# Patient Record
Sex: MALE | Race: WHITE | NOT HISPANIC OR LATINO | Employment: OTHER | URBAN - METROPOLITAN AREA
[De-identification: names, ages, dates, MRNs, and addresses within clinical notes are randomized per-mention and may not be internally consistent; named-entity substitution may affect disease eponyms.]

---

## 2018-03-31 ENCOUNTER — HOSPITAL ENCOUNTER (OUTPATIENT)
Facility: MEDICAL CENTER | Age: 83
End: 2018-04-01
Attending: EMERGENCY MEDICINE | Admitting: HOSPITALIST
Payer: MEDICARE

## 2018-03-31 ENCOUNTER — APPOINTMENT (OUTPATIENT)
Dept: RADIOLOGY | Facility: MEDICAL CENTER | Age: 83
End: 2018-03-31
Attending: EMERGENCY MEDICINE
Payer: MEDICARE

## 2018-03-31 ENCOUNTER — RESOLUTE PROFESSIONAL BILLING HOSPITAL PROF FEE (OUTPATIENT)
Dept: HOSPITALIST | Facility: MEDICAL CENTER | Age: 83
End: 2018-03-31
Payer: MEDICARE

## 2018-03-31 PROBLEM — D64.9 NORMOCYTIC ANEMIA: Status: ACTIVE | Noted: 2018-03-31

## 2018-03-31 PROBLEM — D72.829 LEUKOCYTOSIS: Status: ACTIVE | Noted: 2018-03-31

## 2018-03-31 PROBLEM — E11.649 TYPE 2 DIABETES MELLITUS WITH HYPOGLYCEMIA (HCC): Status: ACTIVE | Noted: 2018-03-31

## 2018-03-31 PROBLEM — E87.1 HYPONATREMIA: Status: ACTIVE | Noted: 2018-03-31

## 2018-03-31 LAB
ALBUMIN SERPL BCP-MCNC: 4.1 G/DL (ref 3.2–4.9)
ALBUMIN/GLOB SERPL: 1.6 G/DL
ALP SERPL-CCNC: 61 U/L (ref 30–99)
ALT SERPL-CCNC: 26 U/L (ref 2–50)
ANION GAP SERPL CALC-SCNC: 9 MMOL/L (ref 0–11.9)
APPEARANCE UR: CLEAR
AST SERPL-CCNC: 26 U/L (ref 12–45)
BASOPHILS # BLD AUTO: 1.7 % (ref 0–1.8)
BASOPHILS # BLD: 0.22 K/UL (ref 0–0.12)
BILIRUB SERPL-MCNC: 1.1 MG/DL (ref 0.1–1.5)
BILIRUB UR QL STRIP.AUTO: NEGATIVE
BUN SERPL-MCNC: 22 MG/DL (ref 8–22)
CALCIUM SERPL-MCNC: 9.1 MG/DL (ref 8.5–10.5)
CHLORIDE SERPL-SCNC: 99 MMOL/L (ref 96–112)
CO2 SERPL-SCNC: 25 MMOL/L (ref 20–33)
COLOR UR: YELLOW
CREAT SERPL-MCNC: 1.23 MG/DL (ref 0.5–1.4)
CULTURE IF INDICATED INDCX: NO UA CULTURE
EOSINOPHIL # BLD AUTO: 0 K/UL (ref 0–0.51)
EOSINOPHIL NFR BLD: 0 % (ref 0–6.9)
ERYTHROCYTE [DISTWIDTH] IN BLOOD BY AUTOMATED COUNT: 41.9 FL (ref 35.9–50)
EST. AVERAGE GLUCOSE BLD GHB EST-MCNC: 214 MG/DL
GLOBULIN SER CALC-MCNC: 2.5 G/DL (ref 1.9–3.5)
GLUCOSE BLD-MCNC: 153 MG/DL (ref 65–99)
GLUCOSE BLD-MCNC: 175 MG/DL (ref 65–99)
GLUCOSE BLD-MCNC: 190 MG/DL (ref 65–99)
GLUCOSE BLD-MCNC: 197 MG/DL (ref 65–99)
GLUCOSE SERPL-MCNC: 122 MG/DL (ref 65–99)
GLUCOSE UR STRIP.AUTO-MCNC: 250 MG/DL
HBA1C MFR BLD: 9.1 % (ref 0–5.6)
HCT VFR BLD AUTO: 35.5 % (ref 42–52)
HGB BLD-MCNC: 11.7 G/DL (ref 14–18)
KETONES UR STRIP.AUTO-MCNC: NEGATIVE MG/DL
LEUKOCYTE ESTERASE UR QL STRIP.AUTO: NEGATIVE
LYMPHOCYTES # BLD AUTO: 4.04 K/UL (ref 1–4.8)
LYMPHOCYTES NFR BLD: 31.6 % (ref 22–41)
MANUAL DIFF BLD: NORMAL
MCH RBC QN AUTO: 31.5 PG (ref 27–33)
MCHC RBC AUTO-ENTMCNC: 33 G/DL (ref 33.7–35.3)
MCV RBC AUTO: 95.7 FL (ref 81.4–97.8)
MICRO URNS: ABNORMAL
MONOCYTES # BLD AUTO: 0.56 K/UL (ref 0–0.85)
MONOCYTES NFR BLD AUTO: 4.4 % (ref 0–13.4)
MORPHOLOGY BLD-IMP: NORMAL
MYELOCYTES NFR BLD MANUAL: 0.9 %
NEUTROPHILS # BLD AUTO: 7.86 K/UL (ref 1.82–7.42)
NEUTROPHILS NFR BLD: 60.5 % (ref 44–72)
NEUTS BAND NFR BLD MANUAL: 0.9 % (ref 0–10)
NITRITE UR QL STRIP.AUTO: NEGATIVE
NRBC # BLD AUTO: 0 K/UL
NRBC BLD-RTO: 0 /100 WBC
PH UR STRIP.AUTO: 5.5 [PH]
PLATELET # BLD AUTO: 157 K/UL (ref 164–446)
PLATELET BLD QL SMEAR: NORMAL
PMV BLD AUTO: 11.2 FL (ref 9–12.9)
POTASSIUM SERPL-SCNC: 4.1 MMOL/L (ref 3.6–5.5)
PROT SERPL-MCNC: 6.6 G/DL (ref 6–8.2)
PROT UR QL STRIP: NEGATIVE MG/DL
RBC # BLD AUTO: 3.71 M/UL (ref 4.7–6.1)
RBC BLD AUTO: PRESENT
RBC UR QL AUTO: NEGATIVE
SMUDGE CELLS BLD QL SMEAR: NORMAL
SODIUM SERPL-SCNC: 133 MMOL/L (ref 135–145)
SP GR UR STRIP.AUTO: 1.01
TROPONIN I SERPL-MCNC: 0.02 NG/ML (ref 0–0.04)
UROBILINOGEN UR STRIP.AUTO-MCNC: 0.2 MG/DL
VARIANT LYMPHS BLD QL SMEAR: NORMAL
WBC # BLD AUTO: 12.8 K/UL (ref 4.8–10.8)

## 2018-03-31 PROCEDURE — 81003 URINALYSIS AUTO W/O SCOPE: CPT

## 2018-03-31 PROCEDURE — 71045 X-RAY EXAM CHEST 1 VIEW: CPT

## 2018-03-31 PROCEDURE — 85007 BL SMEAR W/DIFF WBC COUNT: CPT

## 2018-03-31 PROCEDURE — 80053 COMPREHEN METABOLIC PANEL: CPT

## 2018-03-31 PROCEDURE — 83036 HEMOGLOBIN GLYCOSYLATED A1C: CPT

## 2018-03-31 PROCEDURE — 96372 THER/PROPH/DIAG INJ SC/IM: CPT

## 2018-03-31 PROCEDURE — 99285 EMERGENCY DEPT VISIT HI MDM: CPT

## 2018-03-31 PROCEDURE — 84484 ASSAY OF TROPONIN QUANT: CPT

## 2018-03-31 PROCEDURE — 306588 SLEEVE,VASO CALF MED: Performed by: HOSPITALIST

## 2018-03-31 PROCEDURE — 82962 GLUCOSE BLOOD TEST: CPT

## 2018-03-31 PROCEDURE — 85027 COMPLETE CBC AUTOMATED: CPT

## 2018-03-31 PROCEDURE — 36415 COLL VENOUS BLD VENIPUNCTURE: CPT

## 2018-03-31 PROCEDURE — A9270 NON-COVERED ITEM OR SERVICE: HCPCS | Performed by: HOSPITALIST

## 2018-03-31 PROCEDURE — G0378 HOSPITAL OBSERVATION PER HR: HCPCS

## 2018-03-31 PROCEDURE — 99220 PR INITIAL OBSERVATION CARE,LEVL III: CPT | Performed by: HOSPITALIST

## 2018-03-31 PROCEDURE — 700102 HCHG RX REV CODE 250 W/ 637 OVERRIDE(OP): Performed by: HOSPITALIST

## 2018-03-31 RX ORDER — DEXTROSE MONOHYDRATE 25 G/50ML
25 INJECTION, SOLUTION INTRAVENOUS
Status: DISCONTINUED | OUTPATIENT
Start: 2018-03-31 | End: 2018-04-01 | Stop reason: HOSPADM

## 2018-03-31 RX ORDER — FUROSEMIDE 20 MG/1
10 TABLET ORAL DAILY
Status: DISCONTINUED | OUTPATIENT
Start: 2018-03-31 | End: 2018-04-01 | Stop reason: HOSPADM

## 2018-03-31 RX ORDER — BISACODYL 10 MG
10 SUPPOSITORY, RECTAL RECTAL
Status: DISCONTINUED | OUTPATIENT
Start: 2018-03-31 | End: 2018-04-01 | Stop reason: HOSPADM

## 2018-03-31 RX ORDER — HEPARIN SODIUM 5000 [USP'U]/ML
5000 INJECTION, SOLUTION INTRAVENOUS; SUBCUTANEOUS EVERY 8 HOURS
Status: DISCONTINUED | OUTPATIENT
Start: 2018-03-31 | End: 2018-03-31

## 2018-03-31 RX ORDER — ONDANSETRON 4 MG/1
4 TABLET, ORALLY DISINTEGRATING ORAL EVERY 4 HOURS PRN
Status: DISCONTINUED | OUTPATIENT
Start: 2018-03-31 | End: 2018-04-01 | Stop reason: HOSPADM

## 2018-03-31 RX ORDER — FUROSEMIDE 20 MG/1
10 TABLET ORAL DAILY
COMMUNITY

## 2018-03-31 RX ORDER — INSULIN GLARGINE 100 [IU]/ML
10 INJECTION, SOLUTION SUBCUTANEOUS
Status: DISCONTINUED | OUTPATIENT
Start: 2018-03-31 | End: 2018-04-01 | Stop reason: HOSPADM

## 2018-03-31 RX ORDER — LOSARTAN POTASSIUM 50 MG/1
50 TABLET ORAL DAILY
COMMUNITY

## 2018-03-31 RX ORDER — AMOXICILLIN 250 MG
2 CAPSULE ORAL 2 TIMES DAILY
Status: DISCONTINUED | OUTPATIENT
Start: 2018-03-31 | End: 2018-04-01 | Stop reason: HOSPADM

## 2018-03-31 RX ORDER — POLYETHYLENE GLYCOL 3350 17 G/17G
1 POWDER, FOR SOLUTION ORAL
Status: DISCONTINUED | OUTPATIENT
Start: 2018-03-31 | End: 2018-04-01 | Stop reason: HOSPADM

## 2018-03-31 RX ORDER — ONDANSETRON 2 MG/ML
4 INJECTION INTRAMUSCULAR; INTRAVENOUS EVERY 4 HOURS PRN
Status: DISCONTINUED | OUTPATIENT
Start: 2018-03-31 | End: 2018-04-01 | Stop reason: HOSPADM

## 2018-03-31 RX ORDER — ACETAMINOPHEN 325 MG/1
650 TABLET ORAL EVERY 6 HOURS PRN
Status: DISCONTINUED | OUTPATIENT
Start: 2018-03-31 | End: 2018-04-01 | Stop reason: HOSPADM

## 2018-03-31 RX ORDER — INSULIN GLARGINE 100 [IU]/ML
4-24 INJECTION, SOLUTION SUBCUTANEOUS 2 TIMES DAILY
Status: ON HOLD | COMMUNITY
End: 2018-04-01

## 2018-03-31 RX ADMIN — APIXABAN 5 MG: 5 TABLET, FILM COATED ORAL at 11:20

## 2018-03-31 RX ADMIN — INSULIN GLARGINE 10 UNITS: 100 INJECTION, SOLUTION SUBCUTANEOUS at 13:01

## 2018-03-31 RX ADMIN — FUROSEMIDE 10 MG: 20 TABLET ORAL at 11:20

## 2018-03-31 RX ADMIN — METFORMIN HYDROCHLORIDE 500 MG: 500 TABLET, FILM COATED ORAL at 18:05

## 2018-03-31 RX ADMIN — APIXABAN 5 MG: 5 TABLET, FILM COATED ORAL at 20:05

## 2018-03-31 RX ADMIN — VITAMIN D, TAB 1000IU (100/BT) 1000 UNITS: 25 TAB at 11:20

## 2018-03-31 ASSESSMENT — PATIENT HEALTH QUESTIONNAIRE - PHQ9
1. LITTLE INTEREST OR PLEASURE IN DOING THINGS: NOT AT ALL
2. FEELING DOWN, DEPRESSED, IRRITABLE, OR HOPELESS: NOT AT ALL
SUM OF ALL RESPONSES TO PHQ9 QUESTIONS 1 AND 2: 0

## 2018-03-31 ASSESSMENT — PAIN SCALES - GENERAL
PAINLEVEL_OUTOF10: 0
PAINLEVEL_OUTOF10: 0

## 2018-03-31 ASSESSMENT — ENCOUNTER SYMPTOMS
WHEEZING: 0
DIARRHEA: 0
VOMITING: 0
FEVER: 0
SHORTNESS OF BREATH: 0
NAUSEA: 0
HEADACHES: 0
COUGH: 0
CHILLS: 1
CONSTIPATION: 0

## 2018-03-31 ASSESSMENT — LIFESTYLE VARIABLES
ALCOHOL_USE: NO
EVER_SMOKED: NEVER

## 2018-03-31 NOTE — H&P
Hospital Medicine History and Physical      Date of Service  3/31/2018    Chief Complaint  Chief Complaint   Patient presents with   • ALOC     Upon waking.  Now resolved. Pt reports difficulty speaking initially.  Resolved on arrival.   • Low Blood Sugar     51 at home.  To 122 after PO glucose.       History of Presenting Illness  Jong is a 89 y.o. male w/h/o diabetes who presents with hypoglycemia. Patient did have a history of hypoglycemic episode several years ago where she went to the ER and then this resolved. She could not remember the details. This time, she has been having occasional hypoglycemia over the past week. She [facility] with [facility] as well as [facility] pharmacist and they called her yesterday to decrease her Lantus from 13 twice a day down to 10. Last night was her first dose of the ten units. She did not have any decreased appetite recently or nausea. Patient ate yogurt as well as cereal and pizza. This morning, she woke up and had confusion and difficulty speaking. Thus, EMS was called and noticed that the patient's blood sugar was 51. She was given oral glucose and her sugar improved to 122 and she was brought over to the hospital.   Primary Care Physician  [facility] well as in [place]    Code Status  Full code per patient with family present    Review of Systems  Review of Systems   Constitutional: Positive for chills and malaise/fatigue. Negative for fever.   Respiratory: Negative for cough, shortness of breath and wheezing.    Cardiovascular: Positive for leg swelling. Negative for chest pain.   Gastrointestinal: Negative for constipation, diarrhea, nausea and vomiting.   Genitourinary: Negative for dysuria.   Neurological: Negative for headaches.     Please see HPI, all other systems were reviewed and are negative (AMA/CMS criteria)   Past Medical History  Past Medical History:   Diagnosis Date   • Diabetes (CMS-[facility])    • GERD (gastroesophageal reflux  disease)    • TIA (transient ischemic attack)      Past Surgical history:   Patient denies any previous surgeries     Medications  No current facility-administered medications on file prior to encounter.      No current outpatient prescriptions on file prior to encounter.     Family History  History reviewed. No pertinent family history.  Mother  of old age  Father had strokes  Social History  Social History   Substance Use Topics   • Smoking status: Never Smoker   • Smokeless tobacco: Never Used   • Alcohol use Yes      Comment: rare       Allergies  Allergies   Allergen Reactions   • Pcn [Penicillins]         Physical Exam  Laboratory   Hemodynamics  Temp (24hrs), Av.3 °C (97.3 °F), Min:36 °C (96.8 °F), Max:36.6 °C (97.8 °F)   Temperature: 36 °C (96.8 °F)  Pulse  Av.8  Min: 52  Max: 63 Heart Rate (Monitored): 60  Blood Pressure : 158/66, NIBP: 156/72      Respiratory      Respiration: 14, Pulse Oximetry: 95 %             Physical Exam   Constitutional: He is oriented to person, place, and time. He appears well-developed and well-nourished.   HENT:   Head: Normocephalic.   Right Ear: External ear normal.   Left Ear: External ear normal.   Nose: Nose normal.   Eyes: Conjunctivae and EOM are normal. Pupils are equal, round, and reactive to light. Right eye exhibits no discharge. Left eye exhibits no discharge. No scleral icterus.   Neck: Neck supple. No tracheal deviation present.   Cardiovascular: Normal rate.  Exam reveals no gallop and no friction rub.    Pulmonary/Chest: No respiratory distress. He has no wheezes. He has no rales.   Abdominal: Soft. He exhibits no distension. There is no tenderness. There is no rebound and no guarding.   Musculoskeletal: He exhibits edema.   Neurological: He is alert and oriented to person, place, and time.   Skin: Skin is warm and dry. No rash noted. No erythema.   Psychiatric: He has a normal mood and affect.       Recent Labs      18   0830   WBC  12.8*    RBC  3.71*   HEMOGLOBIN  11.7*   HEMATOCRIT  35.5*   MCV  95.7   MCH  31.5   MCHC  33.0*   RDW  41.9   PLATELETCT  157*   MPV  11.2     Recent Labs      03/31/18   0830   SODIUM  133*   POTASSIUM  4.1   CHLORIDE  99   CO2  25   GLUCOSE  122*   BUN  22   CREATININE  1.23   CALCIUM  9.1     Recent Labs      03/31/18   0830   ALTSGPT  26   ASTSGOT  26   ALKPHOSPHAT  61   TBILIRUBIN  1.1   GLUCOSE  122*                 Lab Results   Component Value Date    TROPONINI 0.02 03/31/2018       Imaging  DX-CHEST-PORTABLE (1 VIEW)   Final Result         Patchy bibasilar opacities could relate to atelectasis.      A 8 mm pulmonary nodule in the left midlung. Further evaluation with CT on nonemergent basis.        EKG  per my independant read:  QTc: 436, HR: 52, junctional bradycardia, no ST/T changes     Assessment/Plan     I anticipate this patient is appropriate for observation status at this time.    Hyponatremia- (present on admission)   Assessment & Plan    - Mild, continue to monitor        Normocytic anemia- (present on admission)   Assessment & Plan    - Mild, continue to monitor        Leukocytosis- (present on admission)   Assessment & Plan    Leukocytosis  - Likely reactive, no signs of overt infection  - Defer antibiotics for now  - Continue to monitor          Type 2 diabetes mellitus with hypoglycemia (CMS-HCC)- (present on admission)   Assessment & Plan    ER pharmacist felt that patient may not completely understand insulin pens and educated patient  Patient likely needs lower dose of Lantus at home  Decreased patient to 10 units b.i.d. and will monitor to see how the patient does here  Hypoglycemic protocol  No sliding scale insulin as patient does not use that at home and we want to replicate her home situation here so we can catch any hypoglycemia that occurs            Prophylaxis:  sc heparin

## 2018-03-31 NOTE — ED PROVIDER NOTES
"ED Provider Note    Scribed for Emigdio Pascual M.D. by Nigel Zavaleta. 3/31/2018  8:38 AM    Primary Care Provider: None  Means of arrival: ambulance  History limited by:     CHIEF COMPLAINT  Chief Complaint   Patient presents with   • ALOC     Upon waking.  Now resolved. Pt reports difficulty speaking initially.  Resolved on arrival.   • Low Blood Sugar     51 at home.  To 122 after PO glucose.       ELIDAI Sunshine is a 89 y.o. male who presents to the ED complaining of altered mental status onset this morning at 6:30 AM. Per patient, he recalls waking up this morning and he reports he did not eat his breakfast. Patient's wife reports the patient was having difficulty speaking which began at 6:30 AM when he woke up this morning. She explains she and the patient were laying in bed when she noticed the patient was having difficulty speaking and seemed confused. When she turned to look at him, she also noticed patient \"looked funny\". The patient endorses associated weakness, neck soreness. Patient's wife notes the patient's blood sugar was 51 upon EMS arrival, for which he was given PO glucose. Afterwards, patient's blood sugar improved to 122. Wife states patient's symptoms were improved after he was given glucose. The patient's symptoms are resolved on exam. The patient does not note any exacerbating factors. Patient's wife confirms a history of Diabetes, for which he takes Glucophage and Lantis. The pattient and his wife do not recall when his medications were last refilled. Wife states the patient seemed normal last night; however, she notes she gave the patient less insulin last night because his sugars have been running low for the past week as recommended by the patient's pharmacist. She adds there has not been any changes to the patient's medications otherwise. Patient denies fevers, shortness of breath, chest pain, sore throat, numbness, weight loss, nausea, vomiting, headache, vision changes, abdominal " "pain, dysuria, polyuria, polydipsia, or rash.     REVIEW OF SYSTEMS    CONSTITUTIONAL: Positive for generalized weakness. Denies fever,  weight gain/loss.  EYES: Denies vision changes  ENT:  Denies sore throat  CARDIOVASCULAR:  Denies chest pain  RESPIRATORY:  Denies shortness of breath  GI:  Denies nausea or vomiting  : Denies dysuria.  SKIN:  No rash  NEUROLOGIC: Positive difficulty speaking, confusion, generalized weakness, neck soreness. Denies headache or numbness.  ENDOCRINE: Positive hypoglycemia. Denies polyuria or polydipsia.   C.       PAST MEDICAL HISTORY  Past Medical History:   Diagnosis Date   • Diabetes (CMS-HCC)    • GERD (gastroesophageal reflux disease)    • TIA (transient ischemic attack)        FAMILY HISTORY  History reviewed. No pertinent family history.    SOCIAL HISTORY   reports that he has never smoked. He has never used smokeless tobacco. He reports that he drinks alcohol. He reports that he does not use drugs.    SURGICAL HISTORY  History reviewed. No pertinent surgical history.    CURRENT MEDICATIONS  Home Medications     Reviewed by Shira Engel R.N. (Registered Nurse) on 03/31/18 at 0826  Med List Status: Partial   Medication Last Dose Status   Apixaban (ELIQUIS PO)  Active   Furosemide (LASIX PO)  Active   INSULIN ASPART SC  Active   metFORMIN (GLUCOPHAGE) 500 MG Tab  Active   Omeprazole (PRILOSEC PO)  Active                ALLERGIES  Allergies   Allergen Reactions   • Pcn [Penicillins]        PHYSICAL EXAM  VITAL SIGNS: /68   Pulse (!) 52   Temp 36.6 °C (97.8 °F)   Resp 16   Ht 1.727 m (5' 8\")   Wt 86.2 kg (190 lb)   SpO2 89%   BMI 28.89 kg/m²      Constitutional: Patient is awake and alert. No acute respiratory distress. Well developed, Well nourished, Non-toxic appearance.  HENT: Normocephalic, Atraumatic, Bilateral external ears normal, Oropharynx pink moist with no exudates, Nose patent. No thymic enlargement.   Eyes: PERRLA, EOMI, Sclera and conjunctiva " clear, No discharge.   Neck:  Supple no nuchal rigidity, no thyromegaly or mass. Non-tender  Lymphatic: No supraclavicular lymph nodes.   Cardiovascular: Distant heart sounds. Heart is regular rate and rhythm.  Thorax & Lungs: Chest is symmetrical, with good breath sounds. No wheezing or crackles. No respiratory distress, No chest tenderness.   Abdomen: Soft, No tenderness no hepatosplenomegaly there is no guarding or rebound, No masses, No pulsatile masses.   Skin: Warm, Dry, no petechia, purpura, or rash.   Extremities: No edema. Non tender.   Musculoskeletal: Good range of motion to upper or lower extremities. No lateralizing weakness Neurologic: Alert & oriented to person, place, and time. Strength is 5 over 5 , bicep triceps, quadriceps, plantar, flexion, and extension. Sensory is intact to light touch to face, arms, and legs. DTRs are symmetrical to biceps, brachioradialis, patella, and Achilles. Finger to nose and heel to shin were normal. No facial asymmetry.  Negative pronator drift.  No focal deficits noted.   Psychiatric: Normal affect      LABS  Results for orders placed or performed during the hospital encounter of 03/31/18   CBC w/ Differential   Result Value Ref Range    WBC 12.8 (H) 4.8 - 10.8 K/uL    RBC 3.71 (L) 4.70 - 6.10 M/uL    Hemoglobin 11.7 (L) 14.0 - 18.0 g/dL    Hematocrit 35.5 (L) 42.0 - 52.0 %    MCV 95.7 81.4 - 97.8 fL    MCH 31.5 27.0 - 33.0 pg    MCHC 33.0 (L) 33.7 - 35.3 g/dL    RDW 41.9 35.9 - 50.0 fL    Platelet Count 157 (L) 164 - 446 K/uL    MPV 11.2 9.0 - 12.9 fL    Nucleated RBC 0.00 /100 WBC    NRBC (Absolute) 0.00 K/uL    Neutrophils-Polys 60.50 44.00 - 72.00 %    Lymphocytes 31.60 22.00 - 41.00 %    Monocytes 4.40 0.00 - 13.40 %    Eosinophils 0.00 0.00 - 6.90 %    Basophils 1.70 0.00 - 1.80 %    Neutrophils (Absolute) 7.86 (H) 1.82 - 7.42 K/uL    Lymphs (Absolute) 4.04 1.00 - 4.80 K/uL    Monos (Absolute) 0.56 0.00 - 0.85 K/uL    Eos (Absolute) 0.00 0.00 - 0.51 K/uL     Baso (Absolute) 0.22 (H) 0.00 - 0.12 K/uL   Complete Metabolic Panel (CMP)   Result Value Ref Range    Sodium 133 (L) 135 - 145 mmol/L    Potassium 4.1 3.6 - 5.5 mmol/L    Chloride 99 96 - 112 mmol/L    Co2 25 20 - 33 mmol/L    Anion Gap 9.0 0.0 - 11.9    Glucose 122 (H) 65 - 99 mg/dL    Bun 22 8 - 22 mg/dL    Creatinine 1.23 0.50 - 1.40 mg/dL    Calcium 9.1 8.5 - 10.5 mg/dL    AST(SGOT) 26 12 - 45 U/L    ALT(SGPT) 26 2 - 50 U/L    Alkaline Phosphatase 61 30 - 99 U/L    Total Bilirubin 1.1 0.1 - 1.5 mg/dL    Albumin 4.1 3.2 - 4.9 g/dL    Total Protein 6.6 6.0 - 8.2 g/dL    Globulin 2.5 1.9 - 3.5 g/dL    A-G Ratio 1.6 g/dL   Troponin   Result Value Ref Range    Troponin I 0.02 0.00 - 0.04 ng/mL   ESTIMATED GFR   Result Value Ref Range    GFR If African American >60 >60 mL/min/1.73 m 2    GFR If Non African American 55 (A) >60 mL/min/1.73 m 2   DIFFERENTIAL MANUAL   Result Value Ref Range    Bands-Stabs 0.90 0.00 - 10.00 %    Myelocytes 0.90 %    Manual Diff Status PERFORMED    PERIPHERAL SMEAR REVIEW   Result Value Ref Range    Peripheral Smear Review see below    PLATELET ESTIMATE   Result Value Ref Range    Plt Estimation Decreased    MORPHOLOGY   Result Value Ref Range    RBC Morphology Present     Smudge Cells Few     Reactive Lymphocytes Few    ACCU-CHEK GLUCOSE   Result Value Ref Range    Glucose - Accu-Ck 175 (H) 65 - 99 mg/dL      All labs reviewed by me.    RADIOLOGY/PROCEDURES  DX-CHEST-PORTABLE (1 VIEW)   Final Result         Patchy bibasilar opacities could relate to atelectasis.      A 8 mm pulmonary nodule in the left midlung. Further evaluation with CT on nonemergent basis.        The radiologist's interpretations of all radiological studies have been reviewed by me.     COURSE & MEDICAL DECISION MAKING  Pertinent Labs & Imaging studies reviewed. (See chart for details)    Differential diagnoses include but are not limited to hypoglycemia, unclear if this medication induced or decreased calory input  versus infections.     8:38 AM - Patient seen and examined at bedside. Ordered chest x-ray, CBC with differential, CMP, Troponin, UA, Hemoglobin A1c.  I discussed the plan of care with the patient and his family. They understood and verbalized agreement.      9:08 AM - I discussed the patient's case and the above findings with Dr. Emanuel (Hospitalist) who agrees to admit the patient.      9:10 AM - Reevaluated the patient at bedside. He is resting comfortably in the gurney and is asymptomatic at this time. Discussed admission plans with the patient and his family. They understood and verbalized agreement.      10:14 AM - Updated patient and his family on the patient's lab and imaging results. Instructed patient to follow-up with his primary care provider regarding is 8 mm pulmonary nodule. The patient and his family do not have further questions at this time.     Decision Making  Patient who presented with confusion. I believe this was from hypoglycemia. For the past week he has been having glucose is down in the 60s to 70s in the mornings. In the mid 100s in the evenings. Last night they decreased his insulin dose however this morning after he woke up prior to breakfast he had slurred speech confusion and weakness. Paramedics arrived and found him with a glucose of 51. He was then given glucose and immediately became normal again. Upon arrival to the emergency room his examination in my opinion is quite good. I see no signs of stroke. I believe that this was a hypoglycemic event. I discussed the case with pharmacy and they've gone over his medications with him and his wife. I also discussed the case with the [facility] Hospitalist and he will admit the patient the hospital for further care and evaluation. The concern of course is quite for the past week as he began running quite low sugars and then today even with a decrease insulin dose last night has more hypoglycemia to the point where asymptomatic. No signs of  infection were found at this time.        DISPOSITION:  Patient will be admitted to Dr. Emanuel in guarded condition.     FINAL IMPRESSION  1. Hypoglycemia  2. Diabetes mellitus    PLAN  1. Admission [facility]ist  2. Continued workup and evaluation for his hypoglycemia       Nigel OROPEZA (Scribe), am scribing for, and in the presence of, Emigdio Pascual M.D..    Electronically signed by: Nigel Zavaleta (Scribe), 3/31/2018    IEmigdio M.D. personally performed the services described in this documentation, as scribed by Nigel Zavaleta in my presence, and it is both accurate and complete.    The note accurately reflects work and decisions made by me.  Emigdio Pascual  3/31/2018  11:13 AM

## 2018-03-31 NOTE — DISCHARGE PLANNING
Care Transition Team Assessment    Children live close by and are available within one hour or so.  Discharge plan is to return home to [place].  No needs at this time.    Information Source  Orientation : Oriented x 4  Information Given By: Spouse  Informant's Name:  (Stephanie Sunshine)  Who is responsible for making decisions for patient? : Patient    Readmission Evaluation  Is this a readmission?: No    Elopement Risk  Legal Hold: No    Interdisciplinary Discharge Planning  Does Admitting Nurse Feel This Could be a Complex Discharge?: No  [facility]  [facility]   [facility])  Lives with - Patient's Self Care Capacity: Spouse  Support Systems: Children, Spouse / Significant Other  Housing / Facility: [address]  Do You Take your Prescribed Medications Regularly: Yes  Able to Return to Previous ADL's: Yes  Mobility Issues: No  Prior Services: None  Patient Expects to be Discharged to::  (Home)  Assistance Needed: No  Durable Medical Equipment: Not Applicable    Discharge Preparedness  What is your plan after discharge?: Home with help  What are your discharge supports?: Child, Spouse  Prior Functional Level: Ambulatory  Difficulity with ADLs: None  Difficulity with IADLs: None    Functional Assesment  Prior Functional Level: Ambulatory    Finances  Financial Barriers to Discharge: No  Prescription Coverage: Yes              Advance Directive  Advance Directive?: Living Will, DPOA for Health Care  Durable Power of  Name and Contact :  (Stephanie Sunshine    [phone])    Domestic Abuse  Have you ever been the victim of abuse or violence?: No  Physical Abuse or Sexual Abuse: No    Psychological Assessment  History of Substance Abuse: None  History of Psychiatric Problems: No    Discharge Risks or Barriers  Discharge risks or barriers?: No    Anticipated Discharge Information  Anticipated discharge disposition: Home  Discharge Address:  ([address] [place] [address]  [id number])  Discharge Contact Phone Number:  ([phone])

## 2018-04-01 VITALS
TEMPERATURE: 97.9 F | WEIGHT: 179.9 LBS | SYSTOLIC BLOOD PRESSURE: 130 MMHG | HEART RATE: 63 BPM | BODY MASS INDEX: 27.26 KG/M2 | RESPIRATION RATE: 16 BRPM | DIASTOLIC BLOOD PRESSURE: 60 MMHG | HEIGHT: 68 IN | OXYGEN SATURATION: 96 %

## 2018-04-01 PROBLEM — E16.2 HYPOGLYCEMIA: Status: ACTIVE | Noted: 2018-04-01

## 2018-04-01 PROBLEM — E16.2 HYPOGLYCEMIA: Status: RESOLVED | Noted: 2018-04-01 | Resolved: 2018-04-01

## 2018-04-01 LAB
ALBUMIN SERPL BCP-MCNC: 3.7 G/DL (ref 3.2–4.9)
BASOPHILS # BLD AUTO: 0 % (ref 0–1.8)
BASOPHILS # BLD: 0 K/UL (ref 0–0.12)
BUN SERPL-MCNC: 20 MG/DL (ref 8–22)
CALCIUM SERPL-MCNC: 8.7 MG/DL (ref 8.5–10.5)
CHLORIDE SERPL-SCNC: 100 MMOL/L (ref 96–112)
CO2 SERPL-SCNC: 24 MMOL/L (ref 20–33)
CREAT SERPL-MCNC: 1.11 MG/DL (ref 0.5–1.4)
EOSINOPHIL # BLD AUTO: 0 K/UL (ref 0–0.51)
EOSINOPHIL NFR BLD: 0 % (ref 0–6.9)
ERYTHROCYTE [DISTWIDTH] IN BLOOD BY AUTOMATED COUNT: 40.6 FL (ref 35.9–50)
GLUCOSE BLD-MCNC: 100 MG/DL (ref 65–99)
GLUCOSE BLD-MCNC: 161 MG/DL (ref 65–99)
GLUCOSE SERPL-MCNC: 118 MG/DL (ref 65–99)
HCT VFR BLD AUTO: 32.7 % (ref 42–52)
HGB BLD-MCNC: 10.9 G/DL (ref 14–18)
LYMPHOCYTES # BLD AUTO: 7.56 K/UL (ref 1–4.8)
LYMPHOCYTES NFR BLD: 46.1 % (ref 22–41)
MANUAL DIFF BLD: NORMAL
MCH RBC QN AUTO: 31.2 PG (ref 27–33)
MCHC RBC AUTO-ENTMCNC: 33.3 G/DL (ref 33.7–35.3)
MCV RBC AUTO: 93.7 FL (ref 81.4–97.8)
MONOCYTES # BLD AUTO: 0.43 K/UL (ref 0–0.85)
MONOCYTES NFR BLD AUTO: 2.6 % (ref 0–13.4)
MORPHOLOGY BLD-IMP: NORMAL
NEUTROPHILS # BLD AUTO: 8.41 K/UL (ref 1.82–7.42)
NEUTROPHILS NFR BLD: 51.3 % (ref 44–72)
NRBC # BLD AUTO: 0 K/UL
NRBC BLD-RTO: 0 /100 WBC
PHOSPHATE SERPL-MCNC: 3.1 MG/DL (ref 2.5–4.5)
PLATELET # BLD AUTO: 147 K/UL (ref 164–446)
PLATELET BLD QL SMEAR: NORMAL
PMV BLD AUTO: 11.3 FL (ref 9–12.9)
POTASSIUM SERPL-SCNC: 4 MMOL/L (ref 3.6–5.5)
RBC # BLD AUTO: 3.49 M/UL (ref 4.7–6.1)
RBC BLD AUTO: NORMAL
SODIUM SERPL-SCNC: 133 MMOL/L (ref 135–145)
WBC # BLD AUTO: 16.4 K/UL (ref 4.8–10.8)

## 2018-04-01 PROCEDURE — 85007 BL SMEAR W/DIFF WBC COUNT: CPT

## 2018-04-01 PROCEDURE — 36415 COLL VENOUS BLD VENIPUNCTURE: CPT

## 2018-04-01 PROCEDURE — 80069 RENAL FUNCTION PANEL: CPT

## 2018-04-01 PROCEDURE — 99217 PR OBSERVATION CARE DISCHARGE: CPT | Performed by: INTERNAL MEDICINE

## 2018-04-01 PROCEDURE — A9270 NON-COVERED ITEM OR SERVICE: HCPCS | Performed by: HOSPITALIST

## 2018-04-01 PROCEDURE — 82962 GLUCOSE BLOOD TEST: CPT

## 2018-04-01 PROCEDURE — G0378 HOSPITAL OBSERVATION PER HR: HCPCS

## 2018-04-01 PROCEDURE — 85027 COMPLETE CBC AUTOMATED: CPT

## 2018-04-01 PROCEDURE — 96372 THER/PROPH/DIAG INJ SC/IM: CPT

## 2018-04-01 PROCEDURE — 700102 HCHG RX REV CODE 250 W/ 637 OVERRIDE(OP): Performed by: HOSPITALIST

## 2018-04-01 RX ORDER — INSULIN GLARGINE 100 [IU]/ML
8-10 INJECTION, SOLUTION SUBCUTANEOUS 2 TIMES DAILY
Qty: 10 ML | Refills: 0 | Status: SHIPPED | OUTPATIENT
Start: 2018-04-01

## 2018-04-01 RX ADMIN — METFORMIN HYDROCHLORIDE 500 MG: 500 TABLET, FILM COATED ORAL at 08:38

## 2018-04-01 RX ADMIN — VITAMIN D, TAB 1000IU (100/BT) 1000 UNITS: 25 TAB at 08:38

## 2018-04-01 RX ADMIN — FUROSEMIDE 10 MG: 20 TABLET ORAL at 08:38

## 2018-04-01 RX ADMIN — INSULIN GLARGINE 10 UNITS: 100 INJECTION, SOLUTION SUBCUTANEOUS at 06:14

## 2018-04-01 RX ADMIN — APIXABAN 5 MG: 5 TABLET, FILM COATED ORAL at 08:38

## 2018-04-01 ASSESSMENT — LIFESTYLE VARIABLES: EVER_SMOKED: NEVER

## 2018-04-01 NOTE — DISCHARGE INSTRUCTIONS
Discharge Instructions    Discharged to home by car with relative. Discharged via walking, hospital escort: Yes.  Special equipment needed: Not Applicable    Be sure to schedule a follow-up appointment with your primary care doctor or any specialists as instructed.     Discharge Plan:   Diet Plan: Discussed  Activity Level: Discussed  Confirmed Follow up Appointment: Patient to Call and Schedule Appointment  Confirmed Symptoms Management: Discussed  Medication Reconciliation Updated: Yes  Influenza Vaccine Indication: Not indicated: Previously immunized this influenza season and > 8 years of age    I understand that a diet low in cholesterol, fat, and sodium is recommended for good health. Unless I have been given specific instructions below for another diet, I accept this instruction as my diet prescription.   Other diet: Diabetic Diet    Special Instructions: None    · Is patient discharged on Warfarin / Coumadin?   No     Depression / Suicide Risk    As you are discharged from this [facility] facility, it is important to learn how to keep safe from harming yourself.    Recognize the warning signs:  · Abrupt changes in personality, positive or negative- including increase in energy   · Giving away possessions  · Change in eating patterns- significant weight changes-  positive or negative  · Change in sleeping patterns- unable to sleep or sleeping all the time   · Unwillingness or inability to communicate  · Depression  · Unusual sadness, discouragement and loneliness  · Talk of wanting to die  · Neglect of personal appearance   · Rebelliousness- reckless behavior  · Withdrawal from people/activities they love  · Confusion- inability to concentrate     If you or a loved one observes any of these behaviors or has concerns about self-harm, here's what you can do:  · Talk about it- your feelings and reasons for harming yourself  · Remove any means that you might use to hurt yourself (examples: pills, rope,  extension cords, firearm)  · Get professional help from the community (Mental Health, Substance Abuse, psychological counseling)  · Do not be alone:Call your Safe Contact- someone whom you trust who will be there for you.  · Call your local CRISIS HOTLINE 066-9200 or 928-659-6233  · Call your local Children's Mobile Crisis Response Team [place] (683) 658-4613 or www.[employer] [facility]  · Call the toll free National Suicide Prevention Hotlines   · National Suicide Prevention Lifeline 423-196-LVVD (8789)  · National Hope Line Network 800-SUICIDE (720-8087)

## 2018-04-01 NOTE — ASSESSMENT & PLAN NOTE
ER pharmacist felt that patient may not completely understand insulin pens and educated patient  Patient likely needs lower dose of Lantus at home  Decreased patient to 10 units b.i.d. and will monitor to see how the patient does here  Hypoglycemic protocol  No sliding scale insulin as patient does not use that at home and we want to replicate her home situation here so we can catch any hypoglycemia that occurs

## 2018-04-01 NOTE — ASSESSMENT & PLAN NOTE
Leukocytosis  - Likely reactive, no signs of overt infection  - Defer antibiotics for now  - Continue to monitor

## 2018-04-01 NOTE — DISCHARGE SUMMARY
CHIEF COMPLAINT ON ADMISSION  Chief Complaint   Patient presents with   • ALOC     Upon waking.  Now resolved. Pt reports difficulty speaking initially.  Resolved on arrival.   • Low Blood Sugar     51 at home.  To 122 after PO glucose.       CODE STATUS  Full Code    HPI & HOSPITAL COURSE    Mr. Margarito Sunshine is a high functioning, very pleasant 89 yo man with PMHx of longstanding type II DM (HgA1c 9.1%) and GERD who was admitted for hypoglycemia with POCT glucose 51. He had acute loss of consciousness, difficulty speaking and confusion after regaining consciousness at home when his glucose was 51. His symptoms were resolved when he was brought to ED and he was back to his normal self after serum glucose improved to 122 with oral glucose.     He reports recent hypoglycemic episodes a few days ago (glucose 64 in morning). His out patient physician instructed him to cut his lantus to 13 units q AM and 10 units q PM. It seems that he and his wife did not understand these instructions and took a higher amount of lantus BID. Furthermore, per [facility], he was supposed to modify this further if he had repeated hypoglycemic episodes (told by [facility] to then cut it down to 13 units q AM and 8 units q PM if another episode of hypoglycemia).     We counseled him on regular glucose measurements and importance of avoiding hypoglycemia. We treated him with 10 units BID lantus as in patient, which he did very well. Patient likely needs lower dose of Lantus at home. At discharge time, we decreased insulin further to 10 units q AM and 8 units q PM. Hypoglycemia can have serious adverse effects in the elderly and this was explained to the patient and his wife.     Observation admission   Therefore, he is discharged in fair and stable condition with close outpatient follow-up.    SPECIFIC OUTPATIENT FOLLOW-UP  F/u with PCP     DISCHARGE PROBLEM LIST  Active Problems:    Type 2 diabetes mellitus with hypoglycemia  (CMS-[facility]) POA: Yes    Leukocytosis POA: Yes    Normocytic anemia POA: Yes    Hyponatremia POA: Yes  Resolved Problems:    Hypoglycemia POA: Yes      FOLLOW UP    [facility]  [address]  [place] [address]  [phone]    In 2 weeks        MEDICATIONS ON DISCHARGE   Margarito Sunshine   Home Medication Instructions MARTIR:[id number]    Printed on:04/01/18 4498   Medication Information                      apixaban (ELIQUIS) 5mg Tab  Take 5 mg by mouth 2 Times a Day.             furosemide (LASIX) 20 MG Tab  Take 10 mg by mouth every day.             insulin glargine (LANTUS) 100 UNIT/ML Solution  Inject 8-10 Units as instructed 2 times a day. 10 units in AM. 8 units in PM.             losartan (COZAAR) 50 MG Tab  Take 50 mg by mouth every day.             metFORMIN (GLUCOPHAGE) 500 MG Tab  Take 500 mg by mouth 2 times a day, with meals.             vitamin D (CHOLECALCIFEROL) 1000 UNIT Tab  Take 1,000 Units by mouth every day.                 DIET  Orders Placed This Encounter   Procedures   • Diet Order     Standing Status:   Standing     Number of Occurrences:   1     Order Specific Question:   Diet:     Answer:   Diabetic [3]       ACTIVITY  As tolerated.  Weight bearing as tolerated      CONSULTATIONS  None     PROCEDURES  None     LABORATORY  Lab Results   Component Value Date/Time    SODIUM 133 (L) 04/01/2018 01:31 AM    POTASSIUM 4.0 04/01/2018 01:31 AM    CHLORIDE 100 04/01/2018 01:31 AM    CO2 24 04/01/2018 01:31 AM    GLUCOSE 118 (H) 04/01/2018 01:31 AM    BUN 20 04/01/2018 01:31 AM    CREATININE 1.11 04/01/2018 01:31 AM        Lab Results   Component Value Date/Time    WBC 16.4 (H) 04/01/2018 01:31 AM    HEMOGLOBIN 10.9 (L) 04/01/2018 01:31 AM    HEMATOCRIT 32.7 (L) 04/01/2018 01:31 AM    PLATELETCT 147 (L) 04/01/2018 01:31 AM        Total time of the discharge process exceeds 31 minutes

## 2018-04-01 NOTE — PROGRESS NOTES
Assumed patient care. Ambulates well. Denies any chest pain. No fever, no shortness of breathe. Family member at bedside.

## 2018-04-01 NOTE — PROGRESS NOTES
A/o,wife at bedside, respirations are even and unlabored, denies any pain, numbness, tingling and n/v at the moment, assessment completed, vital signs stable,  updated communication board,  poc discussed and understood, verbalized understanding, box meal given, all questions answered at this time , fall precautions in place, call button within reach, will continue to monitor

## 2018-04-03 ENCOUNTER — PATIENT OUTREACH (OUTPATIENT)
Dept: HEALTH INFORMATION MANAGEMENT | Facility: OTHER | Age: 83
End: 2018-04-03